# Patient Record
Sex: MALE | Race: BLACK OR AFRICAN AMERICAN | NOT HISPANIC OR LATINO | Employment: UNEMPLOYED | URBAN - NONMETROPOLITAN AREA
[De-identification: names, ages, dates, MRNs, and addresses within clinical notes are randomized per-mention and may not be internally consistent; named-entity substitution may affect disease eponyms.]

---

## 2017-02-27 ENCOUNTER — HOSPITAL ENCOUNTER (INPATIENT)
Facility: HOSPITAL | Age: 37
LOS: 4 days | Discharge: HOME | End: 2017-03-03
Attending: PSYCHIATRY & NEUROLOGY | Admitting: PSYCHIATRY & NEUROLOGY

## 2017-02-27 ENCOUNTER — HOSPITAL ENCOUNTER (EMERGENCY)
Facility: HOSPITAL | Age: 37
Discharge: PSYCHIATRIC OR SUBSTANCE USE FACILITY | End: 2017-02-27
Attending: EMERGENCY MEDICINE | Admitting: EMERGENCY MEDICINE

## 2017-02-27 VITALS
BODY MASS INDEX: 22.6 KG/M2 | TEMPERATURE: 97.6 F | DIASTOLIC BLOOD PRESSURE: 96 MMHG | SYSTOLIC BLOOD PRESSURE: 132 MMHG | RESPIRATION RATE: 19 BRPM | OXYGEN SATURATION: 98 % | WEIGHT: 152.6 LBS | HEIGHT: 69 IN | HEART RATE: 99 BPM

## 2017-02-27 DIAGNOSIS — F22 DELUSIONAL DISORDER (HCC): Primary | ICD-10-CM

## 2017-02-27 PROBLEM — F29 PSYCHOSIS (HCC): Status: ACTIVE | Noted: 2017-02-27

## 2017-02-27 LAB
ALBUMIN SERPL-MCNC: 4.8 G/DL (ref 3.4–4.8)
ALBUMIN/GLOB SERPL: 1.3 G/DL (ref 1.1–1.8)
ALP SERPL-CCNC: 52 U/L (ref 38–126)
ALT SERPL W P-5'-P-CCNC: 27 U/L (ref 21–72)
AMPHET+METHAMPHET UR QL: NEGATIVE
ANION GAP SERPL CALCULATED.3IONS-SCNC: 11 MMOL/L (ref 5–15)
AST SERPL-CCNC: 45 U/L (ref 17–59)
BACTERIA UR QL AUTO: ABNORMAL /HPF
BARBITURATES UR QL SCN: NEGATIVE
BASOPHILS # BLD AUTO: 0.02 10*3/MM3 (ref 0–0.2)
BASOPHILS NFR BLD AUTO: 0.2 % (ref 0–2)
BENZODIAZ UR QL SCN: NEGATIVE
BILIRUB SERPL-MCNC: 1.6 MG/DL (ref 0.2–1.3)
BILIRUB UR QL STRIP: NEGATIVE
BUN BLD-MCNC: 14 MG/DL (ref 7–21)
BUN/CREAT SERPL: 10.6 (ref 7–25)
CALCIUM SPEC-SCNC: 9.7 MG/DL (ref 8.4–10.2)
CANNABINOIDS SERPL QL: NEGATIVE
CHLORIDE SERPL-SCNC: 98 MMOL/L (ref 95–110)
CLARITY UR: CLEAR
CO2 SERPL-SCNC: 29 MMOL/L (ref 22–31)
COCAINE UR QL: NEGATIVE
COLOR UR: YELLOW
CREAT BLD-MCNC: 1.32 MG/DL (ref 0.7–1.3)
DEPRECATED RDW RBC AUTO: 44 FL (ref 35.1–43.9)
EOSINOPHIL # BLD AUTO: 0 10*3/MM3 (ref 0–0.7)
EOSINOPHIL NFR BLD AUTO: 0 % (ref 0–7)
ERYTHROCYTE [DISTWIDTH] IN BLOOD BY AUTOMATED COUNT: 13.4 % (ref 11.5–14.5)
ETHANOL BLD-MCNC: <10 MG/DL (ref 0–10)
ETHANOL UR QL: <0.01 %
GFR SERPL CREATININE-BSD FRML MDRD: 74 ML/MIN/1.73 (ref 70–162)
GLOBULIN UR ELPH-MCNC: 3.7 GM/DL (ref 2.3–3.5)
GLUCOSE BLD-MCNC: 100 MG/DL (ref 60–100)
GLUCOSE UR STRIP-MCNC: NEGATIVE MG/DL
HCT VFR BLD AUTO: 46.9 % (ref 39–49)
HGB BLD-MCNC: 16.1 G/DL (ref 13.7–17.3)
HGB UR QL STRIP.AUTO: ABNORMAL
HOLD SPECIMEN: NORMAL
HOLD SPECIMEN: NORMAL
HYALINE CASTS UR QL AUTO: ABNORMAL /LPF
IMM GRANULOCYTES # BLD: 0.02 10*3/MM3 (ref 0–0.02)
IMM GRANULOCYTES NFR BLD: 0.2 % (ref 0–0.5)
KETONES UR QL STRIP: ABNORMAL
LEUKOCYTE ESTERASE UR QL STRIP.AUTO: NEGATIVE
LYMPHOCYTES # BLD AUTO: 1.44 10*3/MM3 (ref 0.6–4.2)
LYMPHOCYTES NFR BLD AUTO: 17.3 % (ref 10–50)
MCH RBC QN AUTO: 30.7 PG (ref 26.5–34)
MCHC RBC AUTO-ENTMCNC: 34.3 G/DL (ref 31.5–36.3)
MCV RBC AUTO: 89.3 FL (ref 80–98)
METHADONE UR QL SCN: NEGATIVE
MONOCYTES # BLD AUTO: 0.79 10*3/MM3 (ref 0–0.9)
MONOCYTES NFR BLD AUTO: 9.5 % (ref 0–12)
MUCOUS THREADS URNS QL MICRO: ABNORMAL /HPF
NEUTROPHILS # BLD AUTO: 6.07 10*3/MM3 (ref 2–8.6)
NEUTROPHILS NFR BLD AUTO: 72.8 % (ref 37–80)
NITRITE UR QL STRIP: NEGATIVE
OPIATES UR QL: NEGATIVE
OXYCODONE UR QL SCN: NEGATIVE
PH UR STRIP.AUTO: 6 [PH] (ref 5–9)
PLATELET # BLD AUTO: 295 10*3/MM3 (ref 150–450)
PMV BLD AUTO: 8.8 FL (ref 8–12)
POTASSIUM BLD-SCNC: 4.2 MMOL/L (ref 3.5–5.1)
PROT SERPL-MCNC: 8.5 G/DL (ref 6.3–8.6)
PROT UR QL STRIP: ABNORMAL
RBC # BLD AUTO: 5.25 10*6/MM3 (ref 4.37–5.74)
RBC # UR: ABNORMAL /HPF
REF LAB TEST METHOD: ABNORMAL
SODIUM BLD-SCNC: 138 MMOL/L (ref 137–145)
SP GR UR STRIP: 1.02 (ref 1–1.03)
SQUAMOUS #/AREA URNS HPF: ABNORMAL /HPF
T4 FREE SERPL-MCNC: 1.59 NG/DL (ref 0.78–2.19)
TSH SERPL DL<=0.05 MIU/L-ACNC: 0.28 MIU/ML (ref 0.46–4.68)
UROBILINOGEN UR QL STRIP: ABNORMAL
WBC NRBC COR # BLD: 8.34 10*3/MM3 (ref 3.2–9.8)
WBC UR QL AUTO: ABNORMAL /HPF
WHOLE BLOOD HOLD SPECIMEN: NORMAL
WHOLE BLOOD HOLD SPECIMEN: NORMAL

## 2017-02-27 RX ORDER — ACETAMINOPHEN 325 MG/1
650 TABLET ORAL EVERY 4 HOURS PRN
Status: DISCONTINUED | OUTPATIENT
Start: 2017-02-27 | End: 2017-03-03 | Stop reason: HOSPADM

## 2017-02-27 RX ORDER — LOPERAMIDE HYDROCHLORIDE 2 MG/1
2 CAPSULE ORAL 4 TIMES DAILY PRN
Status: DISCONTINUED | OUTPATIENT
Start: 2017-02-27 | End: 2017-03-03 | Stop reason: HOSPADM

## 2017-02-27 RX ORDER — TRAZODONE HYDROCHLORIDE 50 MG/1
50 TABLET ORAL NIGHTLY PRN
Status: DISCONTINUED | OUTPATIENT
Start: 2017-02-27 | End: 2017-03-03 | Stop reason: HOSPADM

## 2017-02-27 RX ORDER — MAGNESIUM HYDROXIDE/ALUMINUM HYDROXICE/SIMETHICONE 120; 1200; 1200 MG/30ML; MG/30ML; MG/30ML
30 SUSPENSION ORAL EVERY 6 HOURS PRN
Status: DISCONTINUED | OUTPATIENT
Start: 2017-02-27 | End: 2017-03-03 | Stop reason: HOSPADM

## 2017-02-27 RX ORDER — DOCUSATE SODIUM 100 MG/1
100 CAPSULE, LIQUID FILLED ORAL 2 TIMES DAILY PRN
Status: DISCONTINUED | OUTPATIENT
Start: 2017-02-27 | End: 2017-03-03 | Stop reason: HOSPADM

## 2017-02-27 RX ORDER — NICOTINE 21 MG/24HR
1 PATCH, TRANSDERMAL 24 HOURS TRANSDERMAL EVERY 24 HOURS
Status: DISCONTINUED | OUTPATIENT
Start: 2017-02-27 | End: 2017-03-03 | Stop reason: HOSPADM

## 2017-02-27 RX ORDER — OLANZAPINE 5 MG/1
15 TABLET, ORALLY DISINTEGRATING ORAL ONCE
Status: COMPLETED | OUTPATIENT
Start: 2017-02-27 | End: 2017-02-27

## 2017-02-27 RX ORDER — CLONIDINE HYDROCHLORIDE 0.1 MG/1
0.1 TABLET ORAL EVERY 4 HOURS PRN
Status: DISCONTINUED | OUTPATIENT
Start: 2017-02-27 | End: 2017-03-03 | Stop reason: HOSPADM

## 2017-02-27 RX ORDER — HYDROXYZINE PAMOATE 50 MG/1
50 CAPSULE ORAL EVERY 6 HOURS PRN
Status: DISCONTINUED | OUTPATIENT
Start: 2017-02-27 | End: 2017-03-03 | Stop reason: HOSPADM

## 2017-02-27 RX ADMIN — NICOTINE 1 PATCH: 21 PATCH, EXTENDED RELEASE TRANSDERMAL at 16:07

## 2017-02-27 RX ADMIN — HYDROXYZINE PAMOATE 50 MG: 50 CAPSULE ORAL at 18:54

## 2017-02-27 RX ADMIN — OLANZAPINE 15 MG: 5 TABLET, ORALLY DISINTEGRATING ORAL at 15:37

## 2017-02-28 PROBLEM — F15.21: Status: ACTIVE | Noted: 2017-02-28

## 2017-02-28 PROBLEM — F20.0 PARANOID SCHIZOPHRENIA (HCC): Status: ACTIVE | Noted: 2017-02-27

## 2017-02-28 PROBLEM — F10.10 ALCOHOL USE DISORDER, MILD, ABUSE: Status: ACTIVE | Noted: 2017-02-28

## 2017-02-28 PROCEDURE — 90791 PSYCH DIAGNOSTIC EVALUATION: CPT | Performed by: PSYCHIATRY & NEUROLOGY

## 2017-02-28 PROCEDURE — 84481 FREE ASSAY (FT-3): CPT | Performed by: FAMILY MEDICINE

## 2017-02-28 PROCEDURE — 70450 CT HEAD/BRAIN W/O DYE: CPT

## 2017-02-28 PROCEDURE — 99222 1ST HOSP IP/OBS MODERATE 55: CPT | Performed by: FAMILY MEDICINE

## 2017-02-28 RX ORDER — RISPERIDONE 1 MG/1
2 TABLET ORAL NIGHTLY
Status: DISCONTINUED | OUTPATIENT
Start: 2017-02-28 | End: 2017-03-01

## 2017-02-28 RX ADMIN — NICOTINE 1 PATCH: 21 PATCH, EXTENDED RELEASE TRANSDERMAL at 08:39

## 2017-02-28 RX ADMIN — RISPERIDONE 2 MG: 1 TABLET ORAL at 20:06

## 2017-03-01 LAB — T3FREE SERPL-MCNC: 2.8 PG/ML (ref 2–4.4)

## 2017-03-01 PROCEDURE — 99232 SBSQ HOSP IP/OBS MODERATE 35: CPT | Performed by: NURSE PRACTITIONER

## 2017-03-01 RX ORDER — RISPERIDONE 1 MG/1
3 TABLET ORAL NIGHTLY
Status: DISCONTINUED | OUTPATIENT
Start: 2017-03-01 | End: 2017-03-03 | Stop reason: HOSPADM

## 2017-03-01 RX ADMIN — RISPERIDONE 3 MG: 1 TABLET ORAL at 20:21

## 2017-03-01 RX ADMIN — NICOTINE 1 PATCH: 21 PATCH, EXTENDED RELEASE TRANSDERMAL at 08:23

## 2017-03-01 RX ADMIN — HYDROXYZINE PAMOATE 50 MG: 50 CAPSULE ORAL at 08:59

## 2017-03-01 NOTE — PLAN OF CARE
Problem: BH Patient Care Overview (Adult)  Goal: Plan of Care Review  Outcome: Ongoing (interventions implemented as appropriate)  Goal: Interdisciplinary Rounds/Family Conference  Outcome: Ongoing (interventions implemented as appropriate)  Goal: Individualization and Mutuality  Outcome: Ongoing (interventions implemented as appropriate)  Goal: Discharge Needs Assessment  Outcome: Ongoing (interventions implemented as appropriate)    Problem:  Overarching Goals  Goal: Adheres to Safety Considerations for Self and Others  Outcome: Ongoing (interventions implemented as appropriate)  Goal: Optimized Coping Skills in Response to Life Stressors  Outcome: Ongoing (interventions implemented as appropriate)  Goal: Develops/Participates in Therapeutic [place] to Support Successful Transition  Outcome: Ongoing (interventions implemented as appropriate)    Problem: Alteration in Thoughts and Perception  Goal: Treatment Goal: Gain control of psychotic behaviors/thinking, reduce/eliminate presenting symptoms and demonstrate improved reality functioning upon discharge  Outcome: Ongoing (interventions implemented as appropriate)  Goal: Verbalize thoughts and feelings associated with:  Outcome: Ongoing (interventions implemented as appropriate)  Goal: Refrain from acting on delusional thinking/internal stimuli  Outcome: Ongoing (interventions implemented as appropriate)    Problem: Risk for Elopement/Wandering  Goal: Monitor Patient to prevent elopement/wandering  Outcome: Ongoing (interventions implemented as appropriate)

## 2017-03-01 NOTE — PROGRESS NOTES
3/1/2017    Chief Complaint: psychosis and paranoia    Subjective:  Patient is a 36 y.o. male who was hospitalized for psychosis, paranoia and errartic.   Since yesterday the patient has been improved, more open to care.  Patient reports that level of hopefulness is 0/10.  Patient reports medications are tolerable.  Further history reported: was seen in treatment team today. States he now believes he should be on medications. Was an active participant in his treatment planning. Reports on 1:1 that he is still paranoid. Not feeling right inside. He continues to be psychotic. Reports feeling hopeless about his future.     Objective     Vital Signs    Temp:  [96.7 °F (35.9 °C)-97.2 °F (36.2 °C)] 96.7 °F (35.9 °C)  Heart Rate:  [66-81] 81  Resp:  [18] 18  BP: (128-154)/(80-98) 142/80    Physical Exam:   General Appearance: alert, appears stated age and cooperative,  Hygiene:   fair  Gait & Station: Normal  Musculoskeletal: No tremors or abnormal involuntary movements}    Mental Status Exam:   Cooperation:  Suspicious  Eye Contact:  Poor  Psychomotor Behavior:  Restless  Mood: Anxious/Nervous  Affect:  flat  Speech:  Monotone and Pressured  Thought Process:  Poverty of thought  Associations: Loose Associations  Thought Content:     Bizarre   Suicidal:  None   Homicidal:  None   Hallucinations:  None   Delusion:  Paranoid  Cognitive Functioning:   Consciousness: awake and alert  Reliability:  fair  Insight:  Poor  Judgement:  Poor  Impulse Control:  Impaired    Lab Results (last 24 hours)     Procedure Component Value Units Date/Time    T3, Free [[id number]] Collected:  02/28/17 1134    Specimen:  Blood Updated:  03/01/17 0833     T3, Free 2.8 pg/mL     Narrative:       Performed at:  [address] [place] [address]  [id number]  : Jas Oliveros PhD, Phone:  [phone]        Imaging Results (last 24 hours)     Procedure Component Value Units Date/Time    CT Head Without Contrast [[id number]]  Collected:  02/28/17 1553     Updated:  02/28/17 1556    Narrative:         Noncontrast CT examination of the brain.    INDICATION:  New onset psychosis     Technique: Axial, coronal, sagittal, without contrast. Radiation  dose-limiting techniques also utilized, including automated  exposure control and adjustment of MA and/or KVP to the patient  size according to ALARA ( as low as reasonably achievable).     Prior relevant examination: None.      Limitations: None    Brain parenchyma appears within normal limits. Ventricles are  within normal limits in size. Normal gray-white matter  differentiation. No evidence of abnormal mass or calcification is  seen. No evidence of acute hemorrhage is noted.    The mastoid air cells are well aerated bilaterally.     The visualized paranasal sinuses appear well aerated bilaterally.    Calvarium intact.        Impression:       CONCLUSION:    1. Normal brain for age    Electronically signed by:  Hemanth Delgadillo MD  2/28/2017 3:54 PM CST  Workstation: [address]          Medicine:   Current Facility-Administered Medications:   •  acetaminophen (TYLENOL) tablet 650 mg, 650 mg, Oral, Q4H PRN, Justen Ponce MD  •  aluminum-magnesium hydroxide-simethicone (MAALOX/MYLANTA) suspension 30 mL, 30 mL, Oral, Q6H PRN, Justen Ponce MD  •  CloNIDine (CATAPRES) tablet 0.1 mg, 0.1 mg, Oral, Q4H PRN, Justen Ponce MD  •  docusate sodium (COLACE) capsule 100 mg, 100 mg, Oral, BID PRN, Justen Ponce MD  •  hydrOXYzine (VISTARIL) capsule 50 mg, 50 mg, Oral, Q6H PRN, Justen Ponce MD, 50 mg at 03/01/17 0859  •  loperamide (IMODIUM) capsule 2 mg, 2 mg, Oral, 4x Daily PRN, Justen Ponce MD  •  magnesium hydroxide (MILK OF MAGNESIA) suspension 2400 mg/10mL 10 mL, 10 mL, Oral, Daily PRN, Justen Ponce MD  •  nicotine (NICODERM CQ) 21 MG/24HR patch 1 patch, 1 patch, Transdermal, Q24H, Justen Ponce MD, 1 patch at 03/01/17 0823  •  risperiDONE (risperDAL) tablet  2 mg, 2 mg, Oral, Nightly, Justen Ponce MD, 2 mg at 02/28/17 2006  •  traZODone (DESYREL) tablet 50 mg, 50 mg, Oral, Nightly PRN, Justen Ponce MD    Diagnoses/Assessment:   Active Problems:    Paranoid schizophrenia    Alcohol use disorder, mild, abuse    Amphetamine use disorder, moderate, in sustained remission    Continues to be psychotic, fearful of other's harming him.    Treatment Plan:    1) Will continue care for the patient on the behavioral health unit at [facility] to ensure patient safety.  2) Will continue to provide treatment with the unit milieu, activities, therapies and psychopharmacological management.  3) Patient to be placed on or continued on  Q15 minute checks  and Elopement precautions.  4) Will continue medical management by Dr. Hargrove's consult.  5) Will order following labs: no new labs  6) Will make the following medication changes: Increase Risperdal to 3 mg at HS.  7) Will continue discharge planning as appropriate for patient.  8) Psychotherapy provided: none    Treatment plan and medication risks and benefits discussed with: Patient and treatment team    Christelle Mccarthy, ADAMA  03/01/17  9:44 AM

## 2017-03-01 NOTE — PLAN OF CARE
Problem: BH Patient Care Overview (Adult)  Goal: Plan of Care Review  Outcome: Ongoing (interventions implemented as appropriate)  Goal: Interdisciplinary Rounds/Family Conference  Outcome: Ongoing (interventions implemented as appropriate)  Goal: Individualization and Mutuality  Outcome: Ongoing (interventions implemented as appropriate)  Goal: Discharge Needs Assessment  Outcome: Ongoing (interventions implemented as appropriate)    Problem:  Overarching Goals  Goal: Adheres to Safety Considerations for Self and Others  Outcome: Ongoing (interventions implemented as appropriate)  Goal: Optimized Coping Skills in Response to Life Stressors  Outcome: Ongoing (interventions implemented as appropriate)  Goal: Develops/Participates in Therapeutic [place] to Support Successful Transition  Outcome: Ongoing (interventions implemented as appropriate)

## 2017-03-01 NOTE — NURSING NOTE
Behavior   Anxiety 10  Depression 3  Pain 0  AVH no  S/I no  H/I no     Pt up in visitation area and walking in the hallway. Pt more interactive with staff this morning, pt interested in knowing what medication he is taking and how they will work. Pt requested to contact his family to check on them and let them know how he was doing.            Intervention  Instructed in med usage and effects, encouraged to verbalize needs. Meds administered as ordered.  Pt given teaching sheet about his medication and questions encouraged.        Response  Verbalized understanding and expressed thanks for written teaching material          Plan  Continue to monitor for safety/  every 15 minute monitoring checks.

## 2017-03-01 NOTE — NURSING NOTE
Behavior   Anxiety 0  Depression 0  Pain 0  AVH no  S/I no  H/I no  Pt sleeping awakened to name/touch.  Oriented to name/time /place when fully awake.  Somewhat sweaty, denies any co anxiety/depression or SI?HI.  Up eating snack when fully awake.  Visiting with peers  Eyes somewhat large/bulging in appearance.  Denies any problems at this time.    Intervention  Instructed in med usage and effects, encouraged to verbalize needs. Meds administered as ordered.          Response  Verbalized understanding           Plan  Continue to monitor for safety/  every 15 minute monitoring checks.

## 2017-03-02 PROCEDURE — 99232 SBSQ HOSP IP/OBS MODERATE 35: CPT | Performed by: PSYCHIATRY & NEUROLOGY

## 2017-03-02 RX ADMIN — RISPERIDONE 3 MG: 1 TABLET ORAL at 20:21

## 2017-03-02 RX ADMIN — HYDROXYZINE PAMOATE 50 MG: 50 CAPSULE ORAL at 14:50

## 2017-03-02 NOTE — PROGRESS NOTES
3/2/2017    Chief Complaint: psychosis and paranoia    Subjective:  Patient is a 36 y.o. male who was hospitalized for psychosis and paranoia.   Since yesterday the patient has been doing better.  He notes that he cont to have some concerns about people around where he lives but he does not report any delusional paranoia.  He reports that he does not need to mind some people and relates this with good insight to another patient on the unit whose behavior can be antagonizing.  He notes he ignores her and does not need to take it personally but also notes that lot of people do.  Patient reports that level of hopefulness is improving.  Patient reports medications are tolerable and effective.  Further history reported: He lives with his parents due to financial issues.  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  He is hoping he can work there again.  He asks about the cost of the medication that I have him on and expresses regret that he had not sought help earlier.    Objective     Vital Signs    Temp:  [96.7 °F (35.9 °C)-98.3 °F (36.8 °C)] 98.3 °F (36.8 °C)  Heart Rate:  [65-80] 65  Resp:  [18] 18  BP: (122-145)/(80-84) 122/80    Physical Exam:   General Appearance: alert, appears stated age and cooperative,  Hygiene:   good  Gait & Station: Normal  Musculoskeletal: No tremors or abnormal involuntary movements    Mental Status Exam:   Cooperation:  Cooperative  Eye Contact:  Good  Psychomotor Behavior:  Appropriate  Mood: Euthymic  Affect:  normal  Speech:  Normal  Thought Process:  Coherent  Associations: Goal Directed  Thought Content:     Normal   Suicidal:  None   Homicidal:  None   Hallucinations:  None   Delusion:  None  Cognitive Functioning:   Consciousness: awake, alert and oriented  Reliability:  good  Insight:  Good  Judgement:  Good  Impulse Control:  Good    Lab Results (last 24 hours)     ** No results found for the last 24 hours. **        Imaging Results (last 24 hours)     ** No  results found for the last 24 hours. **          Medicine:   Current Facility-Administered Medications:   •  acetaminophen (TYLENOL) tablet 650 mg, 650 mg, Oral, Q4H PRN, Justen Ponce MD  •  aluminum-magnesium hydroxide-simethicone (MAALOX/MYLANTA) suspension 30 mL, 30 mL, Oral, Q6H PRN, Justen Ponce MD  •  CloNIDine (CATAPRES) tablet 0.1 mg, 0.1 mg, Oral, Q4H PRN, Justen Ponce MD  •  docusate sodium (COLACE) capsule 100 mg, 100 mg, Oral, BID PRN, Justen Ponce MD  •  hydrOXYzine (VISTARIL) capsule 50 mg, 50 mg, Oral, Q6H PRN, Justen Ponce MD, 50 mg at 03/01/17 0859  •  loperamide (IMODIUM) capsule 2 mg, 2 mg, Oral, 4x Daily PRN, Justen Ponce MD  •  magnesium hydroxide (MILK OF MAGNESIA) suspension 2400 mg/10mL 10 mL, 10 mL, Oral, Daily PRN, Justen Ponce MD  •  nicotine (NICODERM CQ) 21 MG/24HR patch 1 patch, 1 patch, Transdermal, Q24H, Justen Ponce MD, 1 patch at 03/01/17 0823  •  risperiDONE (risperDAL) tablet 3 mg, 3 mg, Oral, Nightly, Christelle Mccarthy, APRN, 3 mg at 03/01/17 2021  •  traZODone (DESYREL) tablet 50 mg, 50 mg, Oral, Nightly PRN, Justen Ponce MD    Diagnoses/Assessment:   Active Problems:    Paranoid schizophrenia    Alcohol use disorder, mild, abuse    Amphetamine use disorder, moderate, in sustained remission      Treatment Plan:    1) Will continue care for the patient on the behavioral health unit at [facility] to ensure patient safety.  2) Will continue to provide treatment with the unit milieu, activities, therapies and psychopharmacological management.  3) Patient to be placed on or continued on  Q15 minute checks  and Elopement precautions.  4) Will continue medical management by Dr. Hargrove.  5) Will order following labs: none  6) Will make the following medication changes: continue the risperdal 3mg qhs  7) Will continue discharge planning as appropriate for patient.  Will plan for family session tomorrow at 11 am with  likely discharge afterward.  8) Psychotherapy provided: none    Treatment plan and medication risks and benefits discussed with: Patient    Justen Ponce MD  03/02/17  12:16 PM

## 2017-03-02 NOTE — PLAN OF CARE
Problem: BH Overarching Goals  Goal: Adheres to Safety Considerations for Self and Others  Outcome: Ongoing (interventions implemented as appropriate)  Pt has followed all rules and regulations. Pt has been pleasant to all.  Goal: Optimized Coping Skills in Response to Life Stressors  Outcome: Ongoing (interventions implemented as appropriate)  Goal: Develops/Participates in Therapeutic [place] to Support Successful Transition  Outcome: Ongoing (interventions implemented as appropriate)    Problem: Alteration in Thoughts and Perception  Goal: Treatment Goal: Gain control of psychotic behaviors/thinking, reduce/eliminate presenting symptoms and demonstrate improved reality functioning upon discharge  Outcome: Ongoing (interventions implemented as appropriate)  Goal: Verbalize thoughts and feelings associated with:  Outcome: Ongoing (interventions implemented as appropriate)  Pt expressed self to RN. Pt more rational today.  Goal: Refrain from acting on delusional thinking/internal stimuli  Outcome: Ongoing (interventions implemented as appropriate)    Problem: Risk for Elopement/Wandering  Goal: Monitor Patient to prevent elopement/wandering  Outcome: Ongoing (interventions implemented as appropriate)  Pt has been pleasant and complied with all rules and directions.

## 2017-03-02 NOTE — NURSING NOTE
Behavior   Anxiety 0  Depression 0  Pain 0  AVH no  S/I no  H/I no  Pt showering,after showering , stated day was ok  Will make good eye contact  Will answer questions yes or no interacting with peers, occasionally laughing, eating snacks denies anxiety or depression at this time.  Does not remember writer from caring for him last night.        Intervention  Instructed in med usage and effects, encouraged to verbalize needs. Meds administered as ordered.          Response  Verbalized understanding           Plan  Continue to monitor for safety/  every 15 minute monitoring checks.

## 2017-03-02 NOTE — NURSING NOTE
Behavior   Anxiety 10  Depression 0  Pain 0  AVH no  S/I no  H/I no   Pt has been calm and cooperative. Pt is obviously anxious by his motor movements. Eye contact is intermittent.  Pt has complied with treatment. Pt reports anxiety from situation.             Intervention  Instructed in med usage and effects, encouraged to verbalize needs. Meds administered as ordered. RN discussed with pt the need to express self.           Response  Verbalized understanding. Pt took meds as prescribed.           Plan  Continue to monitor for safety/  every 15 minute monitoring checks. RN will assess for needs and educate as needed.

## 2017-03-02 NOTE — PLAN OF CARE
Problem: BH Patient Care Overview (Adult)  Goal: Plan of Care Review  Outcome: Ongoing (interventions implemented as appropriate)  Goal: Interdisciplinary Rounds/Family Conference  Outcome: Ongoing (interventions implemented as appropriate)  Goal: Individualization and Mutuality  Outcome: Ongoing (interventions implemented as appropriate)  Goal: Discharge Needs Assessment  Outcome: Ongoing (interventions implemented as appropriate)    Problem: BH Overarching Goals  Goal: Adheres to Safety Considerations for Self and Others  Outcome: Ongoing (interventions implemented as appropriate)  Goal: Optimized Coping Skills in Response to Life Stressors  Outcome: Ongoing (interventions implemented as appropriate)  Goal: Develops/Participates in Therapeutic [place] to Support Successful Transition  Outcome: Ongoing (interventions implemented as appropriate)  Minimal participation in group

## 2017-03-03 VITALS
DIASTOLIC BLOOD PRESSURE: 70 MMHG | HEIGHT: 69 IN | BODY MASS INDEX: 22.5 KG/M2 | OXYGEN SATURATION: 99 % | TEMPERATURE: 96.1 F | HEART RATE: 86 BPM | RESPIRATION RATE: 18 BRPM | SYSTOLIC BLOOD PRESSURE: 141 MMHG | WEIGHT: 151.9 LBS

## 2017-03-03 PROCEDURE — 99238 HOSP IP/OBS DSCHRG MGMT 30/<: CPT | Performed by: PSYCHIATRY & NEUROLOGY

## 2017-03-03 RX ORDER — RISPERIDONE 3 MG/1
3 TABLET ORAL NIGHTLY
Qty: 30 TABLET | Refills: 0 | Status: SHIPPED | OUTPATIENT
Start: 2017-03-03

## 2017-03-03 NOTE — DISCHARGE SUMMARY
Admission Date: 2/27/2017    Discharge Date: 3/3/2017    Psychiatric History: Patient is a 36 y.o. male who presents with psychosis and paranoia. Onset of erratic behavior was gradual starting a few years ago. Symptoms have been present on a increasingly more frequent basis. Symptoms are associated with anxiety and depressed mood. Symptoms are aggravated by untreated illness.  Patients symptoms severity is severe. Patient reports that level of hopefulness is 2/10. Patient's symptoms occur in the context of untreated psychotic symptoms. He feels sad and depressed because he believes his behavior has been erratic. He was hosp due to extreme fearfulness and paranoia. He was scared for his life. He notes feeling that way today as well but not as intense as yesterday. He was given zyprexa zydis on admission to the unit last night.    Diagnostic Data:    Recent Results (from the past 168 hour(s))   Ethanol    Collection Time: 02/27/17 10:53 AM   Result Value Ref Range    Ethanol <10 0 - 10 mg/dL    Ethanol % <0.010 %   TSH    Collection Time: 02/27/17 10:53 AM   Result Value Ref Range    TSH 0.280 (L) 0.460 - 4.680 mIU/mL   T4, Free    Collection Time: 02/27/17 10:53 AM   Result Value Ref Range    Free T4 1.59 0.78 - 2.19 ng/dL   Comprehensive Metabolic Panel    Collection Time: 02/27/17 10:53 AM   Result Value Ref Range    Glucose 100 60 - 100 mg/dL    BUN 14 7 - 21 mg/dL    Creatinine 1.32 (H) 0.70 - 1.30 mg/dL    Sodium 138 137 - 145 mmol/L    Potassium 4.2 3.5 - 5.1 mmol/L    Chloride 98 95 - 110 mmol/L    CO2 29.0 22.0 - 31.0 mmol/L    Calcium 9.7 8.4 - 10.2 mg/dL    Total Protein 8.5 6.3 - 8.6 g/dL    Albumin 4.80 3.40 - 4.80 g/dL    ALT (SGPT) 27 21 - 72 U/L    AST (SGOT) 45 17 - 59 U/L    Alkaline Phosphatase 52 38 - 126 U/L    Total Bilirubin 1.6 (H) 0.2 - 1.3 mg/dL    eGFR  African Amer 74 70 - 162 mL/min/1.73    Globulin 3.7 (H) 2.3 - 3.5 gm/dL    A/G Ratio 1.3 1.1 - 1.8 g/dL    BUN/Creatinine Ratio 10.6 7.0 -  25.0    Anion Gap 11.0 5.0 - 15.0 mmol/L   Light Blue Top    Collection Time: 02/27/17 10:53 AM   Result Value Ref Range    Extra Tube hold for add-on    Green Top (Gel)    Collection Time: 02/27/17 10:53 AM   Result Value Ref Range    Extra Tube Hold for add-ons.    Lavender Top    Collection Time: 02/27/17 10:53 AM   Result Value Ref Range    Extra Tube hold for add-on    Gold Top - SST    Collection Time: 02/27/17 10:53 AM   Result Value Ref Range    Extra Tube Hold for add-ons.    CBC Auto Differential    Collection Time: 02/27/17 10:53 AM   Result Value Ref Range    WBC 8.34 3.20 - 9.80 10*3/mm3    RBC 5.25 4.37 - 5.74 10*6/mm3    Hemoglobin 16.1 13.7 - 17.3 g/dL    Hematocrit 46.9 39.0 - 49.0 %    MCV 89.3 80.0 - 98.0 fL    MCH 30.7 26.5 - 34.0 pg    MCHC 34.3 31.5 - 36.3 g/dL    RDW 13.4 11.5 - 14.5 %    RDW-SD 44.0 (H) 35.1 - 43.9 fl    MPV 8.8 8.0 - 12.0 fL    Platelets 295 150 - 450 10*3/mm3    Neutrophil % 72.8 37.0 - 80.0 %    Lymphocyte % 17.3 10.0 - 50.0 %    Monocyte % 9.5 0.0 - 12.0 %    Eosinophil % 0.0 0.0 - 7.0 %    Basophil % 0.2 0.0 - 2.0 %    Immature Grans % 0.2 0.0 - 0.5 %    Neutrophils, Absolute 6.07 2.00 - 8.60 10*3/mm3    Lymphocytes, Absolute 1.44 0.60 - 4.20 10*3/mm3    Monocytes, Absolute 0.79 0.00 - 0.90 10*3/mm3    Eosinophils, Absolute 0.00 0.00 - 0.70 10*3/mm3    Basophils, Absolute 0.02 0.00 - 0.20 10*3/mm3    Immature Grans, Absolute 0.02 0.00 - 0.02 10*3/mm3   Urine Drug Screen    Collection Time: 02/27/17 11:01 AM   Result Value Ref Range    Amphetamine Screen, Urine Negative Negative    Barbiturates Screen, Urine Negative Negative    Benzodiazepine Screen, Urine Negative Negative    Cocaine Screen, Urine Negative Negative    Methadone Screen, Urine Negative Negative    Opiate Screen Negative Negative    Oxycodone Screen, Urine Negative Negative    THC, Screen, Urine Negative Negative   Urinalysis With / Culture If Indicated    Collection Time: 02/27/17 11:01 AM   Result Value  Ref Range    Color, UA Yellow Yellow, Straw, Dark Yellow, Sayra    Appearance, UA Clear Clear    pH, UA 6.0 5.0 - 9.0    Specific Gravity, UA 1.021 1.003 - 1.030    Glucose, UA Negative Negative    Ketones, UA Trace (A) Negative    Bilirubin, UA Negative Negative    Blood, UA Trace (A) Negative    Protein,  mg/dL (2+) (A) Negative    Leuk Esterase, UA Negative Negative    Nitrite, UA Negative Negative    Urobilinogen, UA 1.0 E.U./dL 0.2 - 1.0 E.U./dL   Urinalysis, Microscopic Only    Collection Time: 02/27/17 11:01 AM   Result Value Ref Range    RBC, UA None Seen None Seen /HPF    WBC, UA 0-2 None Seen, 0-2, 3-5 /HPF    Bacteria, UA Trace (A) None Seen /HPF    Squamous Epithelial Cells, UA 0-2 None Seen, 0-2 /HPF    Hyaline Casts, UA 0-2 None Seen /LPF    Mucus, UA Trace None Seen, Trace /HPF    Methodology Manual Light Microscopy    T3, Free    Collection Time: 02/28/17 11:34 AM   Result Value Ref Range    T3, Free 2.8 2.0 - 4.4 pg/mL     Ct Head Without Contrast    Result Date: 2/28/2017  Narrative: Noncontrast CT examination of the brain. INDICATION:  New onset psychosis Technique: Axial, coronal, sagittal, without contrast. Radiation dose-limiting techniques also utilized, including automated exposure control and adjustment of MA and/or KVP to the patient size according to ALARA ( as low as reasonably achievable). Prior relevant examination: None. Limitations: None Brain parenchyma appears within normal limits. Ventricles are within normal limits in size. Normal gray-white matter differentiation. No evidence of abnormal mass or calcification is seen. No evidence of acute hemorrhage is noted. The mastoid air cells are well aerated bilaterally. The visualized paranasal sinuses appear well aerated bilaterally. Calvarium intact.     Impression: CONCLUSION: 1. Normal brain for age Electronically signed by:  Hemanth Delgadillo MD  2/28/2017 3:54 PM CST Workstation: [id number]      Summary of Hospital Course:   Patient was admitted to the behavioral health unit at [facility] to ensure patient safety.  Patient was provided treatment with the unit milieu, activities, therapies and psychopharmacological management.  Patient was placed on Q15 minute checks and Elopement.  Dr. Hargrove was consulted for management of medical co-morbidities.  Patient was restarted on the following psychiatric medications: none.  The following medication changes were made during the hospital stay: He was given zyprexa zydis his first night due to his severe paranoia and fearfulness.  He was running around the floor due to his fear.  He calmed down with the zyprexa and was started on risperdal that was increased to 3mg qhs over the course of the hospital stay.  Patient had improvement over the course of the hospital stay and tolerated his medications.  Patient was scheduled for family session with parents but they were not able to make it and he was doing well and was discharged without family session.  Substance abuse issues were not present but there was a history of substance abuse in the past.  He last used meth about 2 years ago and he reported that the paranoia started at that time and gradually increased from there till admission.    Patients Condition at Discharge:  Patient is stable for discharge and is not an imminent threat to self or others.  The patient's behavrior was Appropriate.  Patient reported that mood was Euthymic.  Patient's affect was normal.  Patient's thought content was as follows:   Suicidal:  None   Homicidal:  None   Hallucinations:  None   Delusion:  None    Discharge Diagnosis:  Active Problems:    Paranoid schizophrenia    Alcohol use disorder, mild, abuse    Amphetamine use disorder, moderate, in sustained remission      Discharge Medications:      Your medication list      START taking these medications       Instructions Last Dose Given Next Dose Due    risperiDONE 3 MG tablet   Commonly known as:   risperDAL        Take 1 tablet by mouth Every Night.              Where to Get Your Medications      You can get these medications from any pharmacy     Bring a paper prescription for each of these medications    • risperiDONE 3 MG tablet             Justification for multiple antipsychotic medications at discharge:  Not Applicable.    Disposition: Patient was discharged home with family.    Follow-up Information     Follow up with [facility]. Go on 3/9/2017.    Why:  Arrive at 11:15 am for therapy appt with Clarisse Hoyt    Take ID, Ins Card, SS Card, and Med bottles to follow up appt    Call Crisis Hotline as needed at [phone]    Contact information:    [address] [place] [address]  [phone]          Time Spent: Less than 30 minutes.    Justen Ponce MD  03/03/17  11:38 AM

## 2017-03-03 NOTE — PROGRESS NOTES
Patient has attended all recreation therapy groups and exhibited good social interaction.  Upon D/C, his affect is much brighter and he is more hopeful about his future.  Good progress noted toward goals.

## 2017-03-03 NOTE — NURSING NOTE
Behavior   Anxiety 0  Depression 0  Pain 0  AVH no  S/I no  H/I no    PT IS CALM AND MED COMPLIANT.        Intervention  Instructed in med usage and effects, encouraged to verbalize needs. Meds administered as ordered.          Response  Verbalized understanding           Plan  Continue to monitor for safety/  every 15 minute monitoring checks.

## 2017-03-03 NOTE — NURSING NOTE
Pt. Left ambulatory with property, prescriptions, and appointments. Pt. Was in no signs of distress. Pt. Was walked out by MT and went with father to home. All questions answered that pt. Had and education done.

## 2017-03-03 NOTE — PLAN OF CARE
Problem: Alteration in Thoughts and Perception  Goal: Treatment Goal: Gain control of psychotic behaviors/thinking, reduce/eliminate presenting symptoms and demonstrate improved reality functioning upon discharge  Outcome: Ongoing (interventions implemented as appropriate)  Goal: Verbalize thoughts and feelings associated with:  Outcome: Ongoing (interventions implemented as appropriate)  Goal: Refrain from acting on delusional thinking/internal stimuli  Outcome: Ongoing (interventions implemented as appropriate)    Problem: Risk for Elopement/Wandering  Goal: Monitor Patient to prevent elopement/wandering  Outcome: Ongoing (interventions implemented as appropriate)

## 2017-03-03 NOTE — NURSING NOTE
Behavior   Anxiety 5  Depression 0  Pain 0  AVH no  S/I no  H/I no            Intervention  Instructed in med usage and effects, encouraged to verbalize needs. Meds administered as ordered. Pt. Continues to be monitored as ordered per care treatment plan.          Response  Verbalized understanding. Pt. Did not have any complaints at this time.          Plan  Continue to monitor for safety/  every 15 minute monitoring checks. Pt. Continues to be monitored as ordered per pt. Care plan.

## 2017-03-03 NOTE — SIGNIFICANT NOTE
"   03/03/17 1312   Individual Counseling   Time Session Began 1245   Time Session Ended 1310   Total Time (minutes) 25   Topic Safety/DC Plan   Session Detail CSW met with pt 1:1 and spoke with pt's mother via telephone and reviewed safety/dc plan.   Patient Response Pt's mood is improved. Affect is neutral. pt has been plesant and cooperative on the unit. Pt states \"I am not as paranoid now.\" Pt does seem to still be guarded. Pt is planning to return home with parents on this date. CSW educated pt on Crisis Hotline, advised to call as needed. CSW scheduled pt with outpt follow up at [facility] for therapy and advised them to refer for medication management as well. pt has fair insight, does seem to be limited at times. Pt has participated well in individual and group tx, was med compliant. BPRS was complete and pt given Press Ganey to complete and return at dc.     "